# Patient Record
Sex: FEMALE | Race: WHITE | NOT HISPANIC OR LATINO | URBAN - METROPOLITAN AREA
[De-identification: names, ages, dates, MRNs, and addresses within clinical notes are randomized per-mention and may not be internally consistent; named-entity substitution may affect disease eponyms.]

---

## 2018-08-22 ENCOUNTER — OFFICE VISIT CONVERTED (OUTPATIENT)
Dept: GASTROENTEROLOGY | Facility: CLINIC | Age: 23
End: 2018-08-22
Attending: NURSE PRACTITIONER

## 2018-10-25 ENCOUNTER — OFFICE VISIT CONVERTED (OUTPATIENT)
Dept: GASTROENTEROLOGY | Facility: CLINIC | Age: 23
End: 2018-10-25
Attending: NURSE PRACTITIONER

## 2018-12-04 ENCOUNTER — OFFICE VISIT CONVERTED (OUTPATIENT)
Dept: ONCOLOGY | Facility: HOSPITAL | Age: 23
End: 2018-12-04
Attending: THORACIC SURGERY (CARDIOTHORACIC VASCULAR SURGERY)

## 2019-01-10 ENCOUNTER — HOSPITAL ENCOUNTER (OUTPATIENT)
Dept: URGENT CARE | Facility: CLINIC | Age: 24
Discharge: HOME | End: 2019-01-10
Attending: NURSE PRACTITIONER

## 2019-01-12 LAB
AMOXICILLIN+CLAV SUSC ISLT: 4
AMPICILLIN SUSC ISLT: >=32
AMPICILLIN+SULBAC SUSC ISLT: 16
BACTERIA UR CULT: ABNORMAL
CEFAZOLIN SUSC ISLT: <=4
CEFEPIME SUSC ISLT: <=1
CEFTAZIDIME SUSC ISLT: <=1
CEFTRIAXONE SUSC ISLT: <=1
CEFUROXIME ORAL SUSC ISLT: 4
CEFUROXIME PARENTER SUSC ISLT: 4
CIPROFLOXACIN SUSC ISLT: <=0.25
ERTAPENEM SUSC ISLT: <=0.5
GENTAMICIN SUSC ISLT: <=1
LEVOFLOXACIN SUSC ISLT: <=0.12
NITROFURANTOIN SUSC ISLT: <=16
TETRACYCLINE SUSC ISLT: <=1
TMP SMX SUSC ISLT: <=20
TOBRAMYCIN SUSC ISLT: <=1

## 2019-07-02 ENCOUNTER — OFFICE VISIT CONVERTED (OUTPATIENT)
Dept: ONCOLOGY | Facility: HOSPITAL | Age: 24
End: 2019-07-02
Attending: THORACIC SURGERY (CARDIOTHORACIC VASCULAR SURGERY)

## 2019-07-02 ENCOUNTER — HOSPITAL ENCOUNTER (OUTPATIENT)
Dept: ONCOLOGY | Facility: HOSPITAL | Age: 24
Discharge: HOME | End: 2019-07-02
Attending: THORACIC SURGERY (CARDIOTHORACIC VASCULAR SURGERY)

## 2019-09-16 ENCOUNTER — HOSPITAL ENCOUNTER (OUTPATIENT)
Dept: GENERAL RADIOLOGY | Facility: HOSPITAL | Age: 24
Discharge: HOME | End: 2019-09-16
Attending: THORACIC SURGERY (CARDIOTHORACIC VASCULAR SURGERY)

## 2019-09-17 ENCOUNTER — HOSPITAL ENCOUNTER (OUTPATIENT)
Dept: ONCOLOGY | Facility: HOSPITAL | Age: 24
Discharge: HOME | End: 2019-09-17
Attending: THORACIC SURGERY (CARDIOTHORACIC VASCULAR SURGERY)

## 2019-09-17 ENCOUNTER — OFFICE VISIT CONVERTED (OUTPATIENT)
Dept: ONCOLOGY | Facility: HOSPITAL | Age: 24
End: 2019-09-17
Attending: THORACIC SURGERY (CARDIOTHORACIC VASCULAR SURGERY)

## 2019-10-18 ENCOUNTER — OFFICE VISIT CONVERTED (OUTPATIENT)
Dept: ORTHOPEDIC SURGERY | Facility: CLINIC | Age: 24
End: 2019-10-18
Attending: ORTHOPAEDIC SURGERY

## 2019-11-11 ENCOUNTER — HOSPITAL ENCOUNTER (OUTPATIENT)
Dept: PERIOP | Facility: HOSPITAL | Age: 24
Setting detail: HOSPITAL OUTPATIENT SURGERY
Discharge: HOME | End: 2019-11-11
Attending: ORTHOPAEDIC SURGERY

## 2019-11-11 LAB — HCG UR QL: NEGATIVE

## 2019-11-26 ENCOUNTER — OFFICE VISIT CONVERTED (OUTPATIENT)
Dept: ORTHOPEDIC SURGERY | Facility: CLINIC | Age: 24
End: 2019-11-26
Attending: ORTHOPAEDIC SURGERY

## 2021-05-15 VITALS — HEIGHT: 65 IN | HEART RATE: 98 BPM | BODY MASS INDEX: 42.15 KG/M2 | WEIGHT: 253 LBS | OXYGEN SATURATION: 99 %

## 2021-05-15 VITALS — BODY MASS INDEX: 41.65 KG/M2 | HEIGHT: 65 IN | HEART RATE: 111 BPM | WEIGHT: 250 LBS | OXYGEN SATURATION: 97 %

## 2021-05-16 VITALS
HEIGHT: 65 IN | SYSTOLIC BLOOD PRESSURE: 140 MMHG | DIASTOLIC BLOOD PRESSURE: 70 MMHG | WEIGHT: 224.25 LBS | BODY MASS INDEX: 37.36 KG/M2

## 2021-05-16 VITALS
WEIGHT: 232.37 LBS | SYSTOLIC BLOOD PRESSURE: 125 MMHG | BODY MASS INDEX: 38.71 KG/M2 | HEIGHT: 65 IN | DIASTOLIC BLOOD PRESSURE: 60 MMHG

## 2021-05-28 VITALS
SYSTOLIC BLOOD PRESSURE: 123 MMHG | TEMPERATURE: 99.7 F | DIASTOLIC BLOOD PRESSURE: 77 MMHG | BODY MASS INDEX: 40.8 KG/M2 | RESPIRATION RATE: 18 BRPM | RESPIRATION RATE: 16 BRPM | TEMPERATURE: 97.8 F | SYSTOLIC BLOOD PRESSURE: 130 MMHG | WEIGHT: 250.88 LBS | DIASTOLIC BLOOD PRESSURE: 71 MMHG | HEART RATE: 89 BPM | HEART RATE: 96 BPM | HEIGHT: 66 IN | DIASTOLIC BLOOD PRESSURE: 87 MMHG | OXYGEN SATURATION: 98 % | WEIGHT: 246.69 LBS | SYSTOLIC BLOOD PRESSURE: 129 MMHG | RESPIRATION RATE: 16 BRPM | OXYGEN SATURATION: 97 % | HEIGHT: 66 IN | BODY MASS INDEX: 33.94 KG/M2 | WEIGHT: 211.2 LBS | BODY MASS INDEX: 39.65 KG/M2 | TEMPERATURE: 99.4 F | HEART RATE: 104 BPM | OXYGEN SATURATION: 100 %

## 2021-05-28 NOTE — PROGRESS NOTES
Patient: FABIO SOARES     Acct: [id number]     Report: #[id number]  UNIT #: [id number]     : 1995    Encounter Date:2019  PRIMARY CARE: Sal Zhou  ***Signed***  --------------------------------------------------------------------------------------------------------------------  Encounter Date      2019      surgical myotomy            History of Present Illness      Ms. Soares is a 23-year-old female who presents today for continued follow-up of     her type II achalasia.  We previously saw her in 2018.  At that     point time we discussed the risks and benefits of intervention for her type II     achalasia including per oral endoscopic myotomy versus laparoscopic Heller     myotomy with Jose fundoplication.  At that point time the patient wished to     proceed with POEM however her insurance company would not approve the procedure.     Today she states that her symptomatology continues where she experiences     regurgitated non-digested material.  She is tolerating some soft food but has     dysphasia to liquids.  She otherwise denies fever, chills, shortness of breath,     dyspnea on exertion.  She has had a dilation of the GE junction.  She underwent     manometry that showed elevation of a lower esophageal pressure with incomplete     relaxation and severely impaired peristalsis with an elevated IRP and normal     upper esophageal sphincter.            Allergies      Coded Allergies:             NO KNOWN DRUG ALLERGIES (Verified  Allergy, Unknown, 19)            Fatigue      Difficulty Swallowing      Nausea      Dizziness      Anxiety, Depression, Other (PANIC ATTACKS)            No: Ear Surgery      Smoking status:  Never smoker      Alcohol intake:  0-2 drinks per day      Counseling given:  None      Medical History:  Yes: Anemia, Depression, Hemorrhoids/Rectal Prob (DYSPHAGIA,     REFLUX), Shortness Of Breath (HX. OF PNEUMONIA ); No: Blood Disease,      Chemotherapy/Cancer, Deafness or Ringing Ears, Miscellaneous Medical/oth            Medications      Last Reconciled on 7/2/19 14:02 by KATIA SCHNEIDER DO      Phenazopyridine HCl (Pyridium) 200 Mg Tablet      200 MG PO TID, #6 TAB         Prov: JOSE G LEE cfe         1/12/19       Benzonatate (Tessalon Perles) 100 Mg Cap      100 MG PO TID, #30 CAP         Prov: PATRICIA BRYANT cfe         1/10/19       (birth control pill)   No Conflict Check               Reported         11/27/18            Vitals      Height 5484 ft 5.65 in / 107524 cm      Weight 250 lbs 14.136 oz / 113.8 kg      .86 m2      BMI 0.0 kg/m2      Temperature 99.4 F / 37.44 C - Temporal      Pulse 96      Respirations 16      Blood Pressure 123/87 Sitting, Right Arm      Pulse Oximetry 97%, RM AIR            General Appearance:  Alert, Oriented X3      HEENT:  Orophraynx clear, No Erythema, No Exudates      Neck:  Supple, No Masses or JVD      Respiratory:  CTAB      Abdomen:  Soft, No NABS, No Masses, No Hernias, No Hepatosplenomegaly      Cardiovascular:  No Chest Tenderness; Regular Rate and Rhythm      Lymphatic:  No Neck      Extremeties:  Pulses Positive all 4 Ext      Neurological:  Mental Status WNL      Skin:  No Rash, No Cellulitis      Psychiatric:  Appropriate Affect            Ms. Soares presents today for continued follow-up of her type II achalasia.  We     had a yina and candid discussion regarding the treatment options for achalasia.     We discussed that the cornerstone of therapy for motility disorders is to imp    rove esophageal emptying and we are unable to make the esophagus worked better.     We discussed the risk of per oral endoscopic myotomy as well as the risks and     benefits of laparoscopic Heller myotomy with Jose fundoplication.  At this point     time the patient would like to proceed with POEM.  We will proceed with     scheduling this in the near future            PREVENTION      Influenza Vaccine  Declined:  Yes      2 or More Falls Past Year?:  No      Fall Past Year with Injury?:  No      Encouraged to follow-up with:  PCP regarding preventative exams.      Chart initiated by      JUAN MCFARLANE MA                 Disclaimer: Converted document may not contain table formatting or lab diagrams. Please see [employer] [facility] for the authenticated document.

## 2021-05-28 NOTE — PROGRESS NOTES
Patient: FABIO SOARES     Acct: [id number]     Report: #[id number]  UNIT #: [id number]     : 1995    Encounter Date:2018  PRIMARY CARE: Sal Zhou  ***Signed***  --------------------------------------------------------------------------------------------------------------------  Encounter Date      Dec 4, 2018      SURGICAL MYOTOMY            History of Present Illness      Fabio Soares is a 22-year-old female who presents today for continued follow-up     of her type II achalasia.  Since we last saw her she states that she feels as     though her swallowing has worsened and she has had one episode of emesis where     she regurgitated non-digested material.  Otherwise she denies fever, chills,     shortness of breath, dyspnea on exertion.  Of note she has history of an     esophageal dilation at the GE junction which had no effect on her swallowing.      She also underwent an EGD with Dr. Aparicio that showed no evidence of     paraesophageal hernia her high resolution manometry showed elevation of the     lower esophageal pressure with incomplete relaxation and severely impaired     peristalsis with an elevated IRP and normal upper esophageal sphincter            Procedure: EGD            Allergies      Coded Allergies:             NO KNOWN DRUG ALLERGIES (Verified  Allergy, Unknown, 18)            Fatigue      Difficulty Swallowing      Nausea      Dizziness      Anxiety, Depression, Other (PANIC ATTACKS)            No: Ear Surgery      Smoking status:  Never smoker      Alcohol intake:  0-2 drinks per day      Counseling given:  None      Medical History:  Yes: Anemia, Depression, Hemorrhoids/Rectal Prob (DYSPHAGIA,     REFLUX), Shortness Of Breath (HX. OF PNEUMONIA ); No: Blood Disease,     Chemotherapy/Cancer, Deafness or Ringing Ears, Miscellaneous Medical/oth            Medications      Last Reconciled on 18 14:43 by KATIA SCHNEIDER DO      Fluconazole (Diflucan) 50 Mg  Tablet      50 MG PO QDAY, TAB         Reported         11/27/18       (birth control pill)   No Conflict Check               Reported         11/27/18       metroNIDAZOLE (Flagyl*) 375 Mg Capsule      375 MG PO QID, CAP         Reported         11/27/18            Vitals      Height 5 ft 5.75 in / 167 cm      Weight 211 lbs 3.211 oz / 95.8 kg      BSA 2.04 m2      BMI 34.4 kg/m2      Temperature 97.8 F / 36.56 C - Temporal      Pulse 89      Respirations 18      Blood Pressure 129/71 Sitting, Left Arm      Pulse Oximetry 98%, RM AIR            General Appearance:  Alert, Oriented X3      HEENT:  Orophraynx clear, No Erythema, No Exudates      Neck:  Full ROM, No Masses or JVD      Respiratory:  CTAB      Abdomen:  No NABS, No Masses, No Hernias, No Hepatosplenomegaly      Cardiovascular:  No Chest Tenderness; Regular Rate and Rhythm      Lymphatic:  No Neck      Extremeties:  Pulses Positive all 4 Ext      Neurological:  Mental Status WNL      Skin:  No Rash, No Cellulitis      Psychiatric:  Appropriate Affect            Imaging/Interpretation      Upper GI: Shows evidence of distal esophageal narrowing and birds beak     appearance with dilation of the proximal esophagus consistent with the patient's    known history of achalasia            This is a 23-year-old female with a history of type II achalasia.  We had a     yina and candid discussion regarding the treatment of motility disorders.  We     had a discussion regarding the traditional approach of laparoscopic Heller     myotomy with Jose fundoplication.  We also discussed per oral endoscopic myotomy.     The risks and benefits of both procedures were discussed with the patient.  At     this point in time she would like to proceed with the per oral endoscopic     myotomy approach.  We will plan for this in the near future            PREVENTION      Hx Influenza Vaccination:  No      Influenza Vaccine Declined:  Yes      2 or More Falls Past Year?:  No       Fall Past Year with Injury?:  No      Hx Pneumococcal Vaccination:  No      Encouraged to follow-up with:  PCP regarding preventative exams.      Chart initiated by      ENRIQUE GARCIA CMA                 Disclaimer: Converted document may not contain table formatting or lab diagrams. Please see [employer] [facility] for the authenticated document.

## 2021-05-28 NOTE — PROGRESS NOTES
Patient: FABIO BABIN     Acct: [id number]     Report: #[id number]  UNIT #: [id number]     : 1995    Encounter Date:2019  PRIMARY CARE: Sal Zhou  ***Signed***  --------------------------------------------------------------------------------------------------------------------  Encounter Date      Sep 17, 2019      SURGICAL MYOTOMY            History of Present Illness      This is a pleasant 23-year-old female who presents to the thoracic surgical     clinic today for routine follow-up after undergoing per oral endoscopic myotomy     for type II achalasia.  This was performed on 2019.  She had an     uneventful hospital stay and post procedure upper GI showed no evidence of     contrast extravasation and she was discharged home on a full liquid diet.  Since    discharge she states overall that she has been doing well she has occasional     dysphasia but not with every meal similar to previous.  She also states that she    feels as though her emptying is much improved compared to her previous baseline.     She denies fever, chills, shortness of breath, dyspnea on exertion            Procedure: Per oral endoscopic myotomy 2019            Allergies      Coded Allergies:             NO KNOWN DRUG ALLERGIES (Verified  Allergy, Unknown, 19)            Fatigue      Difficulty Swallowing      Nausea      Dizziness      Anxiety, Depression, Other (PANIC ATTACKS)            No: Ear Surgery      Smoking status:  Never smoker      Alcohol intake:  0-2 drinks per day      Counseling given:  None      Medical History:  Yes: Anemia, Depression, Hemorrhoids/Rectal Prob (DYSPHAGIA,     REFLUX), Shortness Of Breath (HX. OF PNEUMONIA ); No: Blood Disease,     Chemotherapy/Cancer, Deafness or Ringing Ears, Miscellaneous Medical/oth            Medications      Last Reconciled on 19 14:02 by KATIA SCHNEIDER DO      No Medication Information Entered            Vitals       Height 5 ft 5.75 in / 167 cm      Weight 246 lbs 11.116 oz / 111.9 kg      BSA 2.18 m2      BMI 40.1 kg/m2      Temperature 99.7 F / 37.61 C - Temporal      Pulse 104      Respirations 16      Blood Pressure 130/77 Sitting, Left Arm      Pulse Oximetry 100%, RM AIR            General Appearance:  Alert, Oriented X3, Obese      HEENT:  Orophraynx clear, No Erythema, No Exudates      Neck:  Supple, No Masses or JVD      Respiratory:  CTAB      Abdomen:  Soft, No NABS, No Masses, No Hernias, No Hepatosplenomegaly      Cardiovascular:  No Chest Tenderness; Regular Rate and Rhythm      Lymphatic:  No Neck      Extremeties:  Pulses Positive all 4 Ext      Neurological:  Mental Status WNL, Alert+Ox3      Musculoskeletal:  Normal Bulk Strength      Skin:  No Rash, No Cellulitis      Psychiatric:  Appropriate Affect            Imaging/Interpretation      I personally reviewed the chest x-ray: There is no evidence of cardiopulmonary     disease, pleural effusion, pneumothorax the mucusotomy closure clips are in     place            Notes      Discontinued Medications      * Benzonatate (Tessalon Perles) 100 MG CAP: 100 MG PO TID #30      * Phenazopyridine HCl (Pyridium) 200 MG TABLET: 200 MG PO TID #6      Ms. Soares is doing well after undergoing per oral endoscopic myotomy for her type    II achalasia her symptomatology is improved on full liquid diet.  Her chest x-    ray is without evidence of abnormality today.  We will plan for her to advance     her diet as tolerated.  We will plan to see her back in 6 months.  She should     continue her Protonix 40 mg p.o. daily for reflux prophylaxis            PREVENTION      Hx Influenza Vaccination:  No      Influenza Vaccine Declined:  Yes      2 or More Falls Past Year?:  No      Fall Past Year with Injury?:  No      Hx Pneumococcal Vaccination:  No      Encouraged to follow-up with:  PCP regarding preventative exams.      Chart initiated by      FABIO CURRIE MA                  Disclaimer: Converted document may not contain table formatting or lab diagrams. Please see [employer] [facility] for the authenticated document.